# Patient Record
Sex: MALE | Race: NATIVE HAWAIIAN OR OTHER PACIFIC ISLANDER | URBAN - METROPOLITAN AREA
[De-identification: names, ages, dates, MRNs, and addresses within clinical notes are randomized per-mention and may not be internally consistent; named-entity substitution may affect disease eponyms.]

---

## 2021-11-16 ENCOUNTER — OFFICE VISIT (OUTPATIENT)
Dept: URBAN - METROPOLITAN AREA CLINIC 91 | Facility: CLINIC | Age: 36
End: 2021-11-16
Payer: COMMERCIAL

## 2021-11-16 DIAGNOSIS — H18.613 KERATOCONUS, STABLE, BILATERAL: Primary | ICD-10-CM

## 2021-11-16 DIAGNOSIS — H17.89 OTHER CORNEAL SCAR: ICD-10-CM

## 2021-11-16 PROCEDURE — 99204 OFFICE O/P NEW MOD 45 MIN: CPT | Performed by: SPECIALIST

## 2021-11-16 PROCEDURE — 92025 CPTRIZED CORNEAL TOPOGRAPHY: CPT | Performed by: SPECIALIST

## 2021-11-16 ASSESSMENT — INTRAOCULAR PRESSURE
OS: 15
OD: 16

## 2021-11-16 ASSESSMENT — VISUAL ACUITY: OD: 20/200

## 2021-11-16 NOTE — IMPRESSION/PLAN
Impression: Keratoconus, stable, bilateral: H18.613. Plan: Unable to get CTL fitted for OD, transplant OD is recommended. For keratoconus OD, I have discussed all possible treatment options including contact lens, placement of Intacs corneal rings and corneal transplant. I have also explained progressive nature of this condition. Explained difference between laser assisted Intacs and standard Intacs. Patient understands Intacs is a treatment only meant to stabilize cornea, not cure it. The patient will likely need glasses or contact lens after procedure. Explained will need to find a place for his ins to cover for cornea transplant before proceeding. Patient will look into changing insurance and will let us know.